# Patient Record
Sex: FEMALE | Race: WHITE
[De-identification: names, ages, dates, MRNs, and addresses within clinical notes are randomized per-mention and may not be internally consistent; named-entity substitution may affect disease eponyms.]

---

## 2019-02-01 ENCOUNTER — HOSPITAL ENCOUNTER (EMERGENCY)
Dept: HOSPITAL 91 - E/R | Age: 54
Discharge: HOME | End: 2019-02-01
Payer: COMMERCIAL

## 2019-02-01 DIAGNOSIS — I10: ICD-10-CM

## 2019-02-01 DIAGNOSIS — E11.9: ICD-10-CM

## 2019-02-01 DIAGNOSIS — Z79.84: ICD-10-CM

## 2019-02-01 DIAGNOSIS — I80.01: Primary | ICD-10-CM

## 2019-02-01 LAB
ADD MAN DIFF?: NO
ANION GAP: 9 (ref 5–13)
BASOPHIL #: 0 10^3/UL (ref 0–0.1)
BASOPHILS %: 0.2 % (ref 0–2)
BLOOD UREA NITROGEN: 23 MG/DL (ref 7–20)
CALCIUM: 9.4 MG/DL (ref 8.4–10.2)
CARBON DIOXIDE: 32 MMOL/L (ref 21–31)
CHLORIDE: 101 MMOL/L (ref 97–110)
CREATININE: 0.94 MG/DL (ref 0.44–1)
EOSINOPHILS #: 0.2 10^3/UL (ref 0–0.5)
EOSINOPHILS %: 1.4 % (ref 0–7)
GLUCOSE: 133 MG/DL (ref 70–220)
HEMATOCRIT: 38.7 % (ref 37–47)
HEMOGLOBIN: 11.9 G/DL (ref 12–16)
IMMATURE GRANS #M: 0.07 10^3/UL (ref 0–0.03)
IMMATURE GRANS % (M): 0.5 % (ref 0–0.43)
INR: 0.9
LYMPHOCYTES #: 3.3 10^3/UL (ref 0.8–2.9)
LYMPHOCYTES %: 24.8 % (ref 15–51)
MEAN CORPUSCULAR HEMOGLOBIN: 24.1 PG (ref 29–33)
MEAN CORPUSCULAR HGB CONC: 30.7 G/DL (ref 32–37)
MEAN CORPUSCULAR VOLUME: 78.3 FL (ref 82–101)
MEAN PLATELET VOLUME: 10.8 FL (ref 7.4–10.4)
MONOCYTE #: 0.6 10^3/UL (ref 0.3–0.9)
MONOCYTES %: 4.3 % (ref 0–11)
NEUTROPHIL #: 9.1 10^3/UL (ref 1.6–7.5)
NEUTROPHILS %: 68.8 % (ref 39–77)
NUCLEATED RED BLOOD CELLS #: 0 10^3/UL (ref 0–0)
NUCLEATED RED BLOOD CELLS%: 0 /100WBC (ref 0–0)
PARTIAL THROMBOPLASTIN TIME: 29.4 SEC (ref 23–35)
PLATELET COUNT: 214 10^3/UL (ref 140–415)
POTASSIUM: 3.7 MMOL/L (ref 3.5–5.1)
PROTIME: 12.3 SEC (ref 11.9–14.9)
PT RATIO: 1
RED BLOOD COUNT: 4.94 10^6/UL (ref 4.2–5.4)
RED CELL DISTRIBUTION WIDTH: 15.8 % (ref 11.5–14.5)
SODIUM: 142 MMOL/L (ref 135–144)
WHITE BLOOD COUNT: 13.2 10^3/UL (ref 4.8–10.8)

## 2019-02-01 PROCEDURE — 85730 THROMBOPLASTIN TIME PARTIAL: CPT

## 2019-02-01 PROCEDURE — 36415 COLL VENOUS BLD VENIPUNCTURE: CPT

## 2019-02-01 PROCEDURE — 85025 COMPLETE CBC W/AUTO DIFF WBC: CPT

## 2019-02-01 PROCEDURE — 80048 BASIC METABOLIC PNL TOTAL CA: CPT

## 2019-02-01 PROCEDURE — 85610 PROTHROMBIN TIME: CPT

## 2019-02-01 PROCEDURE — 99284 EMERGENCY DEPT VISIT MOD MDM: CPT

## 2019-02-01 PROCEDURE — 93971 EXTREMITY STUDY: CPT

## 2019-07-17 ENCOUNTER — HOSPITAL ENCOUNTER (OUTPATIENT)
Dept: HOSPITAL 91 - SDS | Age: 54
Discharge: HOME | End: 2019-07-17
Payer: COMMERCIAL

## 2019-07-17 ENCOUNTER — HOSPITAL ENCOUNTER (OUTPATIENT)
Dept: HOSPITAL 10 - SDS | Age: 54
Discharge: HOME | End: 2019-07-17
Attending: OBSTETRICS & GYNECOLOGY
Payer: COMMERCIAL

## 2019-07-17 VITALS — SYSTOLIC BLOOD PRESSURE: 116 MMHG | HEART RATE: 93 BPM | RESPIRATION RATE: 18 BRPM | DIASTOLIC BLOOD PRESSURE: 59 MMHG

## 2019-07-17 VITALS
BODY MASS INDEX: 53.92 KG/M2 | WEIGHT: 293 LBS | BODY MASS INDEX: 53.92 KG/M2 | WEIGHT: 293 LBS | HEIGHT: 62 IN | HEIGHT: 62 IN

## 2019-07-17 VITALS — DIASTOLIC BLOOD PRESSURE: 57 MMHG | HEART RATE: 80 BPM | SYSTOLIC BLOOD PRESSURE: 106 MMHG | RESPIRATION RATE: 13 BRPM

## 2019-07-17 VITALS — RESPIRATION RATE: 12 BRPM | DIASTOLIC BLOOD PRESSURE: 54 MMHG | SYSTOLIC BLOOD PRESSURE: 89 MMHG | HEART RATE: 62 BPM

## 2019-07-17 VITALS — DIASTOLIC BLOOD PRESSURE: 57 MMHG | HEART RATE: 72 BPM | RESPIRATION RATE: 13 BRPM | SYSTOLIC BLOOD PRESSURE: 96 MMHG

## 2019-07-17 VITALS — RESPIRATION RATE: 15 BRPM | DIASTOLIC BLOOD PRESSURE: 54 MMHG | SYSTOLIC BLOOD PRESSURE: 86 MMHG | HEART RATE: 74 BPM

## 2019-07-17 VITALS — RESPIRATION RATE: 16 BRPM | DIASTOLIC BLOOD PRESSURE: 67 MMHG | SYSTOLIC BLOOD PRESSURE: 122 MMHG | HEART RATE: 77 BPM

## 2019-07-17 VITALS — HEART RATE: 82 BPM | SYSTOLIC BLOOD PRESSURE: 116 MMHG | RESPIRATION RATE: 14 BRPM | DIASTOLIC BLOOD PRESSURE: 64 MMHG

## 2019-07-17 VITALS — RESPIRATION RATE: 20 BRPM | DIASTOLIC BLOOD PRESSURE: 60 MMHG | HEART RATE: 76 BPM | SYSTOLIC BLOOD PRESSURE: 111 MMHG

## 2019-07-17 VITALS — HEART RATE: 74 BPM | RESPIRATION RATE: 13 BRPM | DIASTOLIC BLOOD PRESSURE: 56 MMHG | SYSTOLIC BLOOD PRESSURE: 116 MMHG

## 2019-07-17 VITALS — DIASTOLIC BLOOD PRESSURE: 61 MMHG | RESPIRATION RATE: 14 BRPM | HEART RATE: 82 BPM | SYSTOLIC BLOOD PRESSURE: 112 MMHG

## 2019-07-17 VITALS — DIASTOLIC BLOOD PRESSURE: 55 MMHG | HEART RATE: 68 BPM | RESPIRATION RATE: 14 BRPM | SYSTOLIC BLOOD PRESSURE: 84 MMHG

## 2019-07-17 VITALS — RESPIRATION RATE: 16 BRPM | HEART RATE: 66 BPM | SYSTOLIC BLOOD PRESSURE: 77 MMHG | DIASTOLIC BLOOD PRESSURE: 49 MMHG

## 2019-07-17 VITALS — RESPIRATION RATE: 16 BRPM | DIASTOLIC BLOOD PRESSURE: 61 MMHG | SYSTOLIC BLOOD PRESSURE: 116 MMHG

## 2019-07-17 VITALS — RESPIRATION RATE: 14 BRPM | DIASTOLIC BLOOD PRESSURE: 59 MMHG | HEART RATE: 80 BPM | SYSTOLIC BLOOD PRESSURE: 116 MMHG

## 2019-07-17 VITALS — DIASTOLIC BLOOD PRESSURE: 54 MMHG | RESPIRATION RATE: 16 BRPM | HEART RATE: 68 BPM | SYSTOLIC BLOOD PRESSURE: 82 MMHG

## 2019-07-17 VITALS — HEART RATE: 64 BPM | RESPIRATION RATE: 16 BRPM | SYSTOLIC BLOOD PRESSURE: 93 MMHG | DIASTOLIC BLOOD PRESSURE: 58 MMHG

## 2019-07-17 VITALS — HEART RATE: 78 BPM | RESPIRATION RATE: 13 BRPM | SYSTOLIC BLOOD PRESSURE: 106 MMHG | DIASTOLIC BLOOD PRESSURE: 60 MMHG

## 2019-07-17 DIAGNOSIS — N95.0: Primary | ICD-10-CM

## 2019-07-17 DIAGNOSIS — I10: ICD-10-CM

## 2019-07-17 DIAGNOSIS — E78.5: ICD-10-CM

## 2019-07-17 DIAGNOSIS — E11.9: ICD-10-CM

## 2019-07-17 DIAGNOSIS — D25.9: ICD-10-CM

## 2019-07-17 PROCEDURE — 58558 HYSTEROSCOPY BIOPSY: CPT

## 2019-07-17 PROCEDURE — 82962 GLUCOSE BLOOD TEST: CPT

## 2019-07-17 PROCEDURE — 88305 TISSUE EXAM BY PATHOLOGIST: CPT

## 2019-07-17 RX ADMIN — Medication 1 MG: at 12:21

## 2019-07-17 RX ADMIN — THIAMINE HYDROCHLORIDE 1 MLS/HR: 100 INJECTION, SOLUTION INTRAMUSCULAR; INTRAVENOUS at 12:52

## 2019-07-17 NOTE — PREAC
Date/Time of Note


Date/Time of Note


DATE: 7/17/19 


TIME: 09:36





Anesthesia Eval and Record


Evaluation


Time Pre-Procedure Interview


DATE: 7/17/19 


TIME: 09:36


Age


53


Sex


female


NPO:  8 hrs


Preoperative diagnosis


UTERINE LEIOMYOMA


Planned procedure


HYSTEROSCOPY





Past Medical History


Past Medical History:  Includes


Cardio:  HTN, Dyslipidemia


Endo:  Diabetes


GI:  Morbid obesity (BMI 59)





Surgery & Anesthesia Issues


No known issue





Meds


Anticoagulation:  No


Beta Blocker within 24 hr:  No


Reason Beta Blocker not given:  Pt. not on B-Blocker


Reported Medications


Metformin* (Glucophage*) 850 Mg Tablet, 850 MG PO WITH BREAKFAST DINNE, #60 TAB


   7/17/19


Olmesartan Medoxomil (Benicar) 40 Mg Tablet, 40 MG PO DAILY, #30 TAB


   7/17/19


Discontinued Reported Medications


Ascorbic Acid (Vitamin C) 500 Mg Tab, 1000 MG PO DAILY, TAB


   2/1/19


Sitagliptin* (Januvia*) 100 Mg Tablet, 100 MG PO DAILY, #30 TAB


   2/1/19


Metformin Hcl* (Metformin Hcl*) 850 Mg Tablet, 850 MG PO WITH BREAKFAST DINNE, 


#60 TAB


   2/1/19


Losartan-Hydrochlorothiazide (Losartan-HCTZ) 100-12.5 Mg Tab, 1 TAB PO DAILY, 


TAB


   2/1/19


Discontinued Scripts


Ibuprofen* (Motrin*) 800 Mg Tab, 800 MG PO Q6H PRN for PAIN AND OR ELEVATED 


TEMP, #30 TAB


   Prov:ABDIRAHMAN SUNSHINE MD         2/1/19





Current Medications


Lactated Ringer's 1,000 ml @  125 mls/hr Q8H IV ;  Start 7/17/19 at 06:00;  Stop


7/17/19 at 17:00


Meds reviewed:  No





Allergies


Coded Allergies:  


     No Known Allergy (Unverified , 7/17/19)


Allergies Reviewed:  No





Labs/Studies


Labs Reviewed:  Reviewed by anesthesiologist


Pregnancy test:  Negative


Studies:  ECG





Pre-procedure Exam


Last vitals





Vital Signs


  Date      Temp  Pulse  Resp  B/P (MAP)   Pulse Ox  O2          O2 Flow    FiO2


Time                                                 Delivery    Rate


   7/17/19  98.0     77    16      122/67        95


     08:00                           (85)





Airway:  Adequate mouth opening, Adequate thyromental dist


Mallampati:  Mallampati III


Teeth:  Normal


Lung:  Normal


Heart:  Normal





ASA Physical Status


ASA physical status:  3


Emergency:  None





Planned Anesthetic


General/MAC:  ETT





Planned Pain Management


Parenteral pain med, Local by surgeon





Pre-operative Attestations


Prior to commencing anesthesia and surgery, the patient was re-evaluated, there 


was verification of:


*The patient's identity


*The results of appropriate recent lab work and preoperative vital signs


*The above evaluation not changing prior to induction


*Anesthetic plan, risk benefits, alternative and complications discussed with 


patient/family; questions answered; patient/family understands, accepts and 


wishes to proceed.











JUAN TELLES                  Jul 17, 2019 09:38

## 2019-07-17 NOTE — PAC
Date/Time of Note


Date/Time of Note


DATE: 7/17/19 


TIME: 11:40





Post-Anesthesia Notes


Post-Anesthesia Note


Last documented vital signs


TEMP 99 HR 84 BP 89/45 RR 16 SPO2 99%


Vital Signs


  Date      Temp  Pulse  Resp  B/P (MAP)   Pulse Ox  O2          O2 Flow    FiO2


Time                                                 Delivery    Rate


   7/17/19  98.0     77    16      122/67        95


     08:00                           (85)





Activity:  WNL


Respiratory function:  WNL


Cardiovascular function:  WNL


Mental status:  Baseline


Pain reasonably controlled:  Yes


Hydration appropriate:  Yes


Nausea/Vomiting absent:  Yes











JUAN TELLES                  Jul 17, 2019 11:41

## 2019-07-17 NOTE — HPN
Date/Time of Note


Date/Time of Note


DATE: 7/17/19 


TIME: 09:29





Interval H&P Admission Note


Pt. seen H&P reviewed:  No system changes











JOSE RAMON DANIEL MD                Jul 17, 2019 09:29

## 2019-07-17 NOTE — SIPON
Date/Time of Note


Date/Time of Note


DATE: 7/17/19 


TIME: 11:35





Operative Report


Preoperative Diagnosis


postmenopausal bleeding


prominent endometrium


uterine  fibroid


Postoperative Diagnosis


see pathologic report


Operation/Procedure Performed


multiple attempts of hysteroscopy , failed


D&C


Surgeon


see signature line


First assist


MT ( Bean)


Anesthesia:  spinal, other (sedation)


Estimated blood loss:  minimal


Transfusion Required


   none


Specimen


uterine curettage


Grafts/Implants


none


Complications


none











JOSE RAMON DANIEL MD                Jul 17, 2019 11:38

## 2019-07-17 NOTE — PD.PPDC
OB/GYN Discharge Instruction


Diagnosis


[id number]
Final Diagnosis:  Nsujy6c
prominent endometrium , postmenopapusal


                             bleeding, uterine fibroids








Condition


                [id number]
Patient Condition:  [id number]
Stable








Diet


                Obzjt8Pi
Diet:  Ismng0p
Resume Regular Diet








Activity/Restrictions


             Fjixx9Do
Activity:      Dkeqo3q
May Shower


             [id number]
Restrictions:  Kqvvf8g
No Sexual Activity


                                       Nothing in the Vagina


                                       No [place]


                                       No Tampons, douche








Follow-up


Follow-up with Physician:  2, Week/Weeks





Return to clinic for


         [id number]
GYN Instructions:  Yklyi7t
Fever greater than 101


                                       Chills


                                       Worsening abdominal pain


                                       Excessive Vaginal Bleeding


                                       More than 2 pads per hour


                                       Unable to tolerate diet














JOSE RAMON DANIEL MD                Jul 17, 2019 11:33

## 2019-07-19 NOTE — OPR
DATE OF OPERATION:  

 

 

PREOPERATIVE DIAGNOSES:  

1.  Postmenopausal bleeding.

2.  Prominent endometrium.

3.  Uterine fibroid.

 

POSTOPERATIVE DIAGNOSES:  

1.  See pathological report.

2.  Postmenopausal bleeding.

3.  Prominent endometrium.

4.  Uterine fibroid.

 

OPERATION PERFORMED:  

1.  Multiple attempts of the hysteroscopy; failed, no further trial because of the danger of the comp
lication.

2.  Uterine curettage.

 

ANESTHESIA:  ____

 

ANESTHESIOLOGIST:  Dr. Daniel

 

ASSISTANTS:  Multiple, 5 people, were holding the ____ and the vulva and the speculum, because of the
 morbid obesity.  

 

PROCEDURE IN DETAIL:  Because the patient is high risk of general anesthesia, anesthesiologist, Dr. MANDIE canales, decided to do the spinal anesthesia, which was given, and patient was on the stirrups, prepp
ed and draped in usual aseptic manner.  A weighted speculum was introduced which was not able to see 
the cervix and then trying with multiple instruments with extra hands to separate the vulvae area and
 the cervix was barely fond out and the cavity was sounded, which was 8 cm.  Due to all these factors
 that interrupted proper visualization and tried multiple hysteroscope, which actually entered, but v
isualization was not adequate due to the technology problem with the scope and then reinserted and wi
th the scope multiple times and at this point, rather discontinue the trial of hysteroscope and the c
avity was sounded to 8 cm and also dilated up to 8 and a small uterine curette was introduced into th
e uterine cavity.  Entire uterine cavity was curetted in all directions with obtaining very scanty am
ount of tissue which was contradicting the result of ultrasound.  There was no irregularity noted.  THALIA radford was sent to pathology.  At this point no further trial of the hysteroscope because the danger
 of complication.  All the instruments were removed from the operative field and specimen was sent to
 pathology which was scanty.  The patient sent to the recovery room and going to keep the patient a f
ew hours because of the spinal anesthesia.

 

ESTIMATED BLOOD LOSS:  Negligible.

 

 

Dictated By: MEESOOK KIM MD MK/JEAN

DD:    07/18/2019 18:12:40

DT:    07/19/2019 00:53:49

Conf#: [id number]

DID#:  [id number]